# Patient Record
Sex: MALE | Race: WHITE | URBAN - METROPOLITAN AREA
[De-identification: names, ages, dates, MRNs, and addresses within clinical notes are randomized per-mention and may not be internally consistent; named-entity substitution may affect disease eponyms.]

---

## 2019-08-29 ENCOUNTER — OFFICE VISIT (OUTPATIENT)
Dept: FAMILY MEDICINE | Facility: CLINIC | Age: 59
End: 2019-08-29
Payer: COMMERCIAL

## 2019-08-29 VITALS
WEIGHT: 159.9 LBS | HEART RATE: 71 BPM | BODY MASS INDEX: 21.66 KG/M2 | RESPIRATION RATE: 16 BRPM | HEIGHT: 72 IN | OXYGEN SATURATION: 95 % | TEMPERATURE: 98.6 F | DIASTOLIC BLOOD PRESSURE: 80 MMHG | SYSTOLIC BLOOD PRESSURE: 113 MMHG

## 2019-08-29 DIAGNOSIS — J18.9 PNEUMONIA OF BOTH LOWER LOBES DUE TO INFECTIOUS ORGANISM: Primary | ICD-10-CM

## 2019-08-29 RX ORDER — DOXYCYCLINE 100 MG/1
100 CAPSULE ORAL 2 TIMES DAILY
COMMUNITY
End: 2019-08-29 | Stop reason: ALTCHOICE

## 2019-08-29 RX ORDER — AZITHROMYCIN 250 MG/1
TABLET, FILM COATED ORAL
Qty: 6 TABLET | Refills: 0 | Status: SHIPPED | OUTPATIENT
Start: 2019-08-29 | End: 2019-09-03

## 2019-08-29 RX ORDER — ALBUTEROL SULFATE 90 UG/1
2 AEROSOL, METERED RESPIRATORY (INHALATION) EVERY 6 HOURS
COMMUNITY
End: 2019-09-09

## 2019-08-29 RX ORDER — NAPROXEN 500 MG/1
500 TABLET ORAL 2 TIMES DAILY WITH MEALS
COMMUNITY

## 2019-08-29 ASSESSMENT — MIFFLIN-ST. JEOR: SCORE: 1569.05

## 2019-08-29 NOTE — PATIENT INSTRUCTIONS
STOP taking Doxycycline.    START Azithromycin.     TODAY: Take 2 pills.  8/30- 9/2: Take 1 pill each day.     Please come back to clinic in 2 weeks to recheck cough and weight loss.

## 2019-08-29 NOTE — PROGRESS NOTES
Preceptor Attestation:   Patient seen, evaluated and discussed with the resident. I have verified the content of the note, which accurately reflects my assessment of the patient and the plan of care.   Supervising Physician:  Fredy Garcia MD MD

## 2019-08-29 NOTE — PROGRESS NOTES
"[facility]         SUBJECTIVE       Mamadou Galloway is a 59 year old male with a PMH of recent hernia surgery and diagnosis of community acquired bibasilar pneumonia on 8/25/19 at [facility] ED. Today he is presenting to clinic today with a continued cough and chest congestion after being seen in the ED. Continues to have chills and sweats at night (has been sleeping on a towel at night). Has been taking doxycycline for 4 days and a albuterol inhaler every 4 hours. It seems to control the cough. Has lost of appetite, and reports he has lost 15lbs in the last 15 days. [facility]. No hx of TB.     He has been experiencing headaches (not normal for him), shortness of breath with activity, productive cough, fatigued  Denies hemoptysis, adominal pain, and rashes.     PMH, Medications and Allergies were reviewed and updated as needed.    ROS: Twelve point ROS reviewed. Negative except for what is mentioned in HPI.     Current Outpatient Medications   Medication Sig Dispense Refill     albuterol (PROAIR HFA/PROVENTIL HFA/VENTOLIN HFA) 108 (90 Base) MCG/ACT inhaler Inhale 2 puffs into the lungs every 6 hours       azithromycin (ZITHROMAX) 250 MG tablet Take 2 tablets (500 mg) by mouth daily for 1 day, THEN 1 tablet (250 mg) daily for 4 days. 6 tablet 0     naproxen (NAPROSYN) 500 MG tablet Take 500 mg by mouth 2 times daily (with meals)              OBJECTIVE:       Vitals:   Vitals:    08/29/19 0900 08/29/19 0903   BP: 113/80    Pulse: 71    Resp: 16    Temp: 98.6  F (37  C)    TempSrc: Oral    SpO2: 94% 95%   Weight: 72.5 kg (159 lb 14.4 oz)    Height: 1.822 m (5' 11.73\")      BMI: Body mass index is 21.85 kg/m .     GEN: NAD  EYES: grossly normal to inspection, EOMI, normal conjunctivae/sclerae  HENT: nose & mouth w/o ulcers or lesions, clear oropharynx, MMM  NECK: no LAD, masses  RESP: Prominent wheezing and crackles in bibasilar bases. Patient unable to take a deep breath " without coughing. Productive cough.  CV: RRR, nl S1/S2, no m/r/g, no peripheral edema  ABD: soft, NT/ND, no obvious hepatosplenomegaly/masses, no rebound, +BS throughout  MSK: no MSK defects noted, gait is age appropriate w/o ataxia  SKIN: no suspicious lesions or rashes  NEURO: no obvious focal deficits, normal strength and tone, mentation intact, speech normal  PSYCH: mentation appears normal          ASSESSMENT and PLAN:     (J18.1) Pneumonia of both lower lobes due to infectious organism (H)  (primary encounter diagnosis)  Comment: Changed antibiotic from doxycycline to azithromycin since there was no improvement of symptoms after 4 days of antibiotics. Patient has some other concerning symptoms that may or may not be associated with the pneumonia, including loss of appetite and weight loss. Would like  patient to return to clinic after treating for pneumonia to follow up on these symptoms.     Plan: azithromycin (ZITHROMAX) 250 MG tablet    Patient instructions:  STOP taking Doxycycline.  START Azithromycin.   TODAY: Take 2 pills.  8/30- 9/2: Take 1 pill each day.   Please come back to clinic in 2 weeks to recheck cough and weight loss.     Options for treatment and/or follow-up care were reviewed with the patient was actively involved in the decision making process. Patient verbalized understanding and was in agreement with the plan.    The patient was seen by and discussed with MD Valarie Dc MD PGY1  [facility]  [phone]

## 2019-09-09 ENCOUNTER — OFFICE VISIT (OUTPATIENT)
Dept: FAMILY MEDICINE | Facility: CLINIC | Age: 59
End: 2019-09-09
Payer: COMMERCIAL

## 2019-09-09 VITALS
TEMPERATURE: 97.5 F | BODY MASS INDEX: 23.01 KG/M2 | HEART RATE: 50 BPM | RESPIRATION RATE: 14 BRPM | OXYGEN SATURATION: 97 % | DIASTOLIC BLOOD PRESSURE: 64 MMHG | SYSTOLIC BLOOD PRESSURE: 99 MMHG | WEIGHT: 168.4 LBS

## 2019-09-09 DIAGNOSIS — J18.9 PNEUMONIA OF BOTH LOWER LOBES DUE TO INFECTIOUS ORGANISM: Primary | ICD-10-CM

## 2019-09-09 NOTE — PROGRESS NOTES
[facility]         SUBJECTIVE       Mamadou Galloawy is a 59 year old male with a PMH of recent hernia surgery and diagnosis of community acquired bibasilar pneumonia on 8/25/19 at [facility] ED. He was seen in clinic on August 29 for continued cough. He had been on doxycycline, and we changed his antibiotic to azithromycin since there is no improvement in symptoms after 4 days of doxycycline.  It was recommended that patient return to clinic given concerning symptoms of loss of appetite and weight loss.    Presenting to clinic today with a chief complaint of sweats at night and continued wheezing. The patient does have air conditioning where he lives. The sweats have been happening for just over one month (approximately when the other symptoms of pneumonia were happening). The wheezing worsens when he is active, and the albuterol that he was prescribed in the emergency department does not seem to help.  He reports that he is no longer using the albuterol.  He notes that his appetite has dramatically improved and overall he is feeling much better.    Hx of smoking over 40 years ago.     PMH, Medications and Allergies were reviewed and updated as needed.    ROS:  General: No fevers, chills  Head: No headache  Ears: No acute change in hearing.    CV: No chest pain or palpitations.  Resp: No shortness of breath.  No cough. No hemoptysis.  GI: No nausea, vomiting, constipation, diarrhea  : No urinary pains      Current Outpatient Medications   Medication Sig Dispense Refill     naproxen (NAPROSYN) 500 MG tablet Take 500 mg by mouth 2 times daily (with meals)              OBJECTIVE:       Vitals:   Vitals:    09/09/19 0825   BP: 99/64   BP Location: Left arm   Patient Position: Sitting   Cuff Size: Adult Regular   Pulse: 50   Resp: 14   Temp: 97.5  F (36.4  C)   TempSrc: Oral   SpO2: 97%   Weight: 76.4 kg (168 lb 6.4 oz)     BMI: Body mass index is 23.01 kg/m .    GEN: NAD, healthy, alert  EYES:  grossly normal to inspection, EOMI, normal conjunctivae/sclerae  HENT: nose & mouth w/o ulcers or lesions, clear oropharynx, MMM  RESP: Right lower lobe inspiratory wheezing.  Cough with deep breathing.  Otherwise lung fields are clear.  CV: RRR, nl S1/S2, no m/r/g, no peripheral edema  ABD: soft, NT/ND  MSK: no MSK defects noted, gait is age appropriate w/o ataxia  SKIN: no suspicious lesions or rashes  NEURO: no obvious focal deficits, normal strength and tone, mentation intact, speech normal  PSYCH: mentation appears normal, affect normal/bright          ASSESSMENT and PLAN:     (J18.1) Pneumonia of both lower lobes due to infectious organism (H) -- Second encounter  Comment: Patient symptoms have dramatically improved.  He has gained about 8 pounds since he was seen 2 weeks ago due to improved appetite.  The patient is likely still healing from the pneumonia given he continues to have some wheezing and sweating at night.  There is less concern for malignancy or other process given dramatic improvement of other symptoms, specifically improved appetite and weight gain.  Patient reports a 5-pack-year history, however he quit smoking over 40 years ago.  We discussed that he could be experiencing residual symptoms from the pneumonia or he could have some underlying lung pathology separate from the pneumonia.  If patient's symptoms do not improve over the next month I recommend that the patient return for pulmonary function testing.  Would be reasonable to consider a chest x-ray as well.    Options for treatment and/or follow-up care were reviewed with the patient was actively involved in the decision making process. Patient verbalized understanding and was in agreement with the plan.    The patient was seen by and discussed with MD Valarie Shelton MD PGY1  [facility]  [phone]

## 2019-09-09 NOTE — PATIENT INSTRUCTIONS
Continue healing. Rest. Eating. Drinking water.     Consider coming back in 1 month for lung function testing if your symptoms do not improve.     Thank you,  Dr. Mock

## 2019-09-30 NOTE — PROGRESS NOTES
Preceptor Attestation:   Patient seen, evaluated and discussed with the resident. I have verified the content of the note, which accurately reflects my assessment of the patient and the plan of care.   Supervising Physician:  Francoise Hernandez MD.

## 2020-04-02 ENCOUNTER — TELEPHONE (OUTPATIENT)
Dept: FAMILY MEDICINE | Facility: CLINIC | Age: 60
End: 2020-04-02

## 2020-05-04 ENCOUNTER — TELEPHONE (OUTPATIENT)
Dept: FAMILY MEDICINE | Facility: CLINIC | Age: 60
End: 2020-05-04

## 2020-05-04 NOTE — LETTER
May 4, 2020      Mamadou Galloway  [address] [place] [address]        Dear Mamadou,      We tried reaching you by phone but were unable to connect with you. We are reaching out to see how you are doing. This is a very stressful time in the world, which can cause an increase in personal stress and anxiety.     Our clinic is open.  We are here for you and are ready to meet all of your healthcare needs.  We have delayed preventive care until July.  We want everyone who can to stay home during this time for their health and the health of all.  We are now having most visits over the phone, Video, but will see people in person if your doctor agrees that it is necessary.      Call us with any questions or concerns you may have, and know that we are all in this together.       Sincerely,     Your team at [facility]  [phone]

## 2020-05-04 NOTE — TELEPHONE ENCOUNTER
Tried calling patient again but didn't get hold of patient, so COVID 19 Outreach letter was sent.  Bessy, ZACKERY